# Patient Record
Sex: MALE | Race: WHITE | NOT HISPANIC OR LATINO | Employment: UNEMPLOYED | URBAN - METROPOLITAN AREA
[De-identification: names, ages, dates, MRNs, and addresses within clinical notes are randomized per-mention and may not be internally consistent; named-entity substitution may affect disease eponyms.]

---

## 2019-03-28 ENCOUNTER — HOSPITAL ENCOUNTER (INPATIENT)
Facility: HOSPITAL | Age: 44
LOS: 1 days | Discharge: LEFT BEFORE DISCHARGE | DRG: 281 | End: 2019-03-29
Attending: EMERGENCY MEDICINE | Admitting: EMERGENCY MEDICINE
Payer: COMMERCIAL

## 2019-03-28 DIAGNOSIS — R11.2 NAUSEA AND VOMITING: ICD-10-CM

## 2019-03-28 DIAGNOSIS — E87.6 HYPOKALEMIA: ICD-10-CM

## 2019-03-28 DIAGNOSIS — I21.4 NSTEMI (NON-ST ELEVATED MYOCARDIAL INFARCTION): Primary | ICD-10-CM

## 2019-03-28 DIAGNOSIS — R11.10 VOMITING: ICD-10-CM

## 2019-03-28 DIAGNOSIS — R79.89 ELEVATED TROPONIN: ICD-10-CM

## 2019-03-28 PROCEDURE — 84484 ASSAY OF TROPONIN QUANT: CPT

## 2019-03-28 PROCEDURE — 96375 TX/PRO/DX INJ NEW DRUG ADDON: CPT

## 2019-03-28 PROCEDURE — 85007 BL SMEAR W/DIFF WBC COUNT: CPT

## 2019-03-28 PROCEDURE — 99285 EMERGENCY DEPT VISIT HI MDM: CPT | Mod: 25

## 2019-03-28 PROCEDURE — 80053 COMPREHEN METABOLIC PANEL: CPT

## 2019-03-28 PROCEDURE — 96361 HYDRATE IV INFUSION ADD-ON: CPT

## 2019-03-28 PROCEDURE — 83690 ASSAY OF LIPASE: CPT

## 2019-03-28 PROCEDURE — 96365 THER/PROPH/DIAG IV INF INIT: CPT

## 2019-03-28 PROCEDURE — 85027 COMPLETE CBC AUTOMATED: CPT

## 2019-03-28 RX ORDER — ONDANSETRON 2 MG/ML
8 INJECTION INTRAMUSCULAR; INTRAVENOUS
Status: COMPLETED | OUTPATIENT
Start: 2019-03-29 | End: 2019-03-28

## 2019-03-28 RX ADMIN — SODIUM CHLORIDE 1000 ML: 0.9 INJECTION, SOLUTION INTRAVENOUS at 11:03

## 2019-03-28 RX ADMIN — ONDANSETRON 8 MG: 2 INJECTION INTRAMUSCULAR; INTRAVENOUS at 11:03

## 2019-03-29 VITALS
RESPIRATION RATE: 18 BRPM | DIASTOLIC BLOOD PRESSURE: 85 MMHG | HEART RATE: 103 BPM | OXYGEN SATURATION: 93 % | HEIGHT: 73 IN | TEMPERATURE: 98 F | WEIGHT: 212.31 LBS | BODY MASS INDEX: 28.14 KG/M2 | SYSTOLIC BLOOD PRESSURE: 142 MMHG

## 2019-03-29 PROBLEM — I10 ACCELERATED HYPERTENSION: Status: ACTIVE | Noted: 2019-03-29

## 2019-03-29 PROBLEM — E87.6 HYPOKALEMIA: Status: ACTIVE | Noted: 2019-03-29

## 2019-03-29 PROBLEM — F12.90 MARIJUANA USE, CONTINUOUS: Status: ACTIVE | Noted: 2019-03-29

## 2019-03-29 PROBLEM — N17.9 AKI (ACUTE KIDNEY INJURY): Status: ACTIVE | Noted: 2019-03-29

## 2019-03-29 PROBLEM — D72.829 LEUKOCYTOSIS: Status: ACTIVE | Noted: 2019-03-29

## 2019-03-29 PROBLEM — I21.4 NSTEMI (NON-ST ELEVATED MYOCARDIAL INFARCTION): Status: ACTIVE | Noted: 2019-03-29

## 2019-03-29 PROBLEM — E78.5 HYPERLIPIDEMIA: Status: ACTIVE | Noted: 2019-03-29

## 2019-03-29 PROBLEM — R11.2 NAUSEA AND VOMITING: Status: ACTIVE | Noted: 2019-03-29

## 2019-03-29 LAB
ALBUMIN SERPL BCP-MCNC: 5.5 G/DL (ref 3.5–5.2)
ALLENS TEST: ABNORMAL
ALP SERPL-CCNC: 72 U/L (ref 55–135)
ALT SERPL W/O P-5'-P-CCNC: 31 U/L (ref 10–44)
ANION GAP SERPL CALC-SCNC: 11 MMOL/L (ref 8–16)
ANION GAP SERPL CALC-SCNC: 18 MMOL/L (ref 8–16)
ANION GAP SERPL CALC-SCNC: 22 MMOL/L (ref 8–16)
AORTIC ROOT ANNULUS: 3.34 CM
AORTIC VALVE CUSP SEPERATION: 2.64 CM
ASCENDING AORTA: 2.98 CM
AST SERPL-CCNC: 35 U/L (ref 10–40)
AV INDEX (PROSTH): 0.76
AV MEAN GRADIENT: 4.62 MMHG
AV PEAK GRADIENT: 8.07 MMHG
AV VALVE AREA: 2.57 CM2
AV VELOCITY RATIO: 0.72
B-OH-BUTYR BLD STRIP-SCNC: 0.3 MMOL/L (ref 0–0.5)
BACTERIA #/AREA URNS HPF: ABNORMAL /HPF
BASOPHILS # BLD AUTO: 0.01 K/UL (ref 0–0.2)
BASOPHILS NFR BLD: 0 % (ref 0–1.9)
BASOPHILS NFR BLD: 0.1 % (ref 0–1.9)
BILIRUB SERPL-MCNC: 1.3 MG/DL (ref 0.1–1)
BILIRUB UR QL STRIP: ABNORMAL
BNP SERPL-MCNC: 76 PG/ML (ref 0–99)
BSA FOR ECHO PROCEDURE: 2.23 M2
BUN SERPL-MCNC: 29 MG/DL (ref 6–20)
BUN SERPL-MCNC: 32 MG/DL (ref 6–20)
BUN SERPL-MCNC: 38 MG/DL (ref 6–20)
CALCIUM SERPL-MCNC: 10 MG/DL (ref 8.7–10.5)
CALCIUM SERPL-MCNC: 11.6 MG/DL (ref 8.7–10.5)
CALCIUM SERPL-MCNC: 9.6 MG/DL (ref 8.7–10.5)
CHLORIDE SERPL-SCNC: 104 MMOL/L (ref 95–110)
CHLORIDE SERPL-SCNC: 89 MMOL/L (ref 95–110)
CHLORIDE SERPL-SCNC: 98 MMOL/L (ref 95–110)
CHOLEST SERPL-MCNC: 226 MG/DL (ref 120–199)
CHOLEST/HDLC SERPL: 4.7 {RATIO} (ref 2–5)
CLARITY UR: CLEAR
CO2 SERPL-SCNC: 28 MMOL/L (ref 23–29)
CO2 SERPL-SCNC: 29 MMOL/L (ref 23–29)
CO2 SERPL-SCNC: 31 MMOL/L (ref 23–29)
COLOR UR: ABNORMAL
CREAT SERPL-MCNC: 1 MG/DL (ref 0.5–1.4)
CREAT SERPL-MCNC: 1.2 MG/DL (ref 0.5–1.4)
CREAT SERPL-MCNC: 1.8 MG/DL (ref 0.5–1.4)
CRP SERPL-MCNC: 19.9 MG/L (ref 0–8.2)
CV ECHO LV RWT: 0.74 CM
CV STRESS BASE HR: 90 BPM
D DIMER PPP IA.FEU-MCNC: 0.64 MG/L FEU
DELSYS: ABNORMAL
DIASTOLIC BLOOD PRESSURE: 96 MMHG
DIFFERENTIAL METHOD: ABNORMAL
DIFFERENTIAL METHOD: ABNORMAL
DOP CALC AO PEAK VEL: 1.42 M/S
DOP CALC AO VTI: 25.7 CM
DOP CALC LVOT AREA: 3.36 CM2
DOP CALC LVOT DIAMETER: 2.07 CM
DOP CALC LVOT PEAK VEL: 1.02 M/S
DOP CALC LVOT STROKE VOLUME: 66.06 CM3
DOP CALCLVOT PEAK VEL VTI: 19.64 CM
E WAVE DECELERATION TIME: 168.81 MSEC
E/A RATIO: 1.04
E/E' RATIO: 5.83
ECHO LV POSTERIOR WALL: 1.6 CM (ref 0.6–1.1)
EOSINOPHIL # BLD AUTO: 0 K/UL (ref 0–0.5)
EOSINOPHIL NFR BLD: 0 % (ref 0–8)
EOSINOPHIL NFR BLD: 0 % (ref 0–8)
ERYTHROCYTE [DISTWIDTH] IN BLOOD BY AUTOMATED COUNT: 13.2 % (ref 11.5–14.5)
ERYTHROCYTE [DISTWIDTH] IN BLOOD BY AUTOMATED COUNT: 13.3 % (ref 11.5–14.5)
ERYTHROCYTE [SEDIMENTATION RATE] IN BLOOD BY WESTERGREN METHOD: 11 MM/HR (ref 0–10)
EST. GFR  (AFRICAN AMERICAN): 53 ML/MIN/1.73 M^2
EST. GFR  (AFRICAN AMERICAN): >60 ML/MIN/1.73 M^2
EST. GFR  (AFRICAN AMERICAN): >60 ML/MIN/1.73 M^2
EST. GFR  (NON AFRICAN AMERICAN): 46 ML/MIN/1.73 M^2
EST. GFR  (NON AFRICAN AMERICAN): >60 ML/MIN/1.73 M^2
EST. GFR  (NON AFRICAN AMERICAN): >60 ML/MIN/1.73 M^2
FRACTIONAL SHORTENING: 28 % (ref 28–44)
GLUCOSE SERPL-MCNC: 109 MG/DL (ref 70–110)
GLUCOSE SERPL-MCNC: 140 MG/DL (ref 70–110)
GLUCOSE SERPL-MCNC: 183 MG/DL (ref 70–110)
GLUCOSE UR QL STRIP: NEGATIVE
HCO3 UR-SCNC: 32.2 MMOL/L (ref 24–28)
HCT VFR BLD AUTO: 52.3 % (ref 40–54)
HCT VFR BLD AUTO: 53.5 % (ref 40–54)
HDLC SERPL-MCNC: 48 MG/DL (ref 40–75)
HDLC SERPL: 21.2 % (ref 20–50)
HGB BLD-MCNC: 18.4 G/DL (ref 14–18)
HGB BLD-MCNC: 18.5 G/DL (ref 14–18)
HGB UR QL STRIP: NEGATIVE
HYALINE CASTS #/AREA URNS LPF: 10 /LPF
INTERVENTRICULAR SEPTUM: 1.66 CM (ref 0.6–1.1)
IVRT: 0.12 MSEC
KETONES UR QL STRIP: ABNORMAL
LA MAJOR: 5.5 CM
LA MINOR: 4.36 CM
LA WIDTH: 3.45 CM
LACTATE SERPL-SCNC: 2.9 MMOL/L (ref 0.5–2.2)
LACTATE SERPL-SCNC: 4.1 MMOL/L (ref 0.5–2.2)
LDLC SERPL CALC-MCNC: 138 MG/DL (ref 63–159)
LEFT ATRIUM SIZE: 4 CM
LEFT ATRIUM VOLUME INDEX: 25.9 ML/M2
LEFT ATRIUM VOLUME: 57.06 CM3
LEFT INTERNAL DIMENSION IN SYSTOLE: 3.1 CM (ref 2.1–4)
LEFT VENTRICLE DIASTOLIC VOLUME INDEX: 37.74 ML/M2
LEFT VENTRICLE DIASTOLIC VOLUME: 83.3 ML
LEFT VENTRICLE MASS INDEX: 133.6 G/M2
LEFT VENTRICLE SYSTOLIC VOLUME INDEX: 17.2 ML/M2
LEFT VENTRICLE SYSTOLIC VOLUME: 37.99 ML
LEFT VENTRICULAR INTERNAL DIMENSION IN DIASTOLE: 4.31 CM (ref 3.5–6)
LEFT VENTRICULAR MASS: 294.91 G
LEUKOCYTE ESTERASE UR QL STRIP: NEGATIVE
LIPASE SERPL-CCNC: 11 U/L (ref 4–60)
LV LATERAL E/E' RATIO: 4.12
LV SEPTAL E/E' RATIO: 10
LYMPHOCYTES # BLD AUTO: 2.1 K/UL (ref 1–4.8)
LYMPHOCYTES NFR BLD: 12 % (ref 18–48)
LYMPHOCYTES NFR BLD: 6 % (ref 18–48)
MAGNESIUM SERPL-MCNC: 1.8 MG/DL (ref 1.6–2.6)
MCH RBC QN AUTO: 31.2 PG (ref 27–31)
MCH RBC QN AUTO: 31.4 PG (ref 27–31)
MCHC RBC AUTO-ENTMCNC: 34.4 G/DL (ref 32–36)
MCHC RBC AUTO-ENTMCNC: 35.4 G/DL (ref 32–36)
MCV RBC AUTO: 89 FL (ref 82–98)
MCV RBC AUTO: 91 FL (ref 82–98)
MICROSCOPIC COMMENT: ABNORMAL
MONOCYTES # BLD AUTO: 0.3 K/UL (ref 0.3–1)
MONOCYTES NFR BLD: 1.9 % (ref 4–15)
MONOCYTES NFR BLD: 11 % (ref 4–15)
MV PEAK A VEL: 0.67 M/S
MV PEAK E VEL: 0.7 M/S
NEUTROPHILS # BLD AUTO: 15.3 K/UL (ref 1.8–7.7)
NEUTROPHILS NFR BLD: 74 % (ref 38–73)
NEUTROPHILS NFR BLD: 86 % (ref 38–73)
NEUTS BAND NFR BLD MANUAL: 9 %
NITRITE UR QL STRIP: NEGATIVE
NONHDLC SERPL-MCNC: 178 MG/DL
NUC STRESS EJECTION FRACTION: 56 %
OHS CV CPX 85 PERCENT MAX PREDICTED HEART RATE MALE: 152
OHS CV CPX MAX PREDICTED HEART RATE: 179
OHS CV CPX PATIENT IS FEMALE: 0
OHS CV CPX PATIENT IS MALE: 1
OHS CV CPX PEAK DIASTOLIC BLOOD PRESSURE: 92 MMHG
OHS CV CPX PEAK HEAR RATE: 130 BPM
OHS CV CPX PEAK RATE PRESSURE PRODUCT: NORMAL
OHS CV CPX PEAK SYSTOLIC BLOOD PRESSURE: 126 MMHG
OHS CV CPX PERCENT MAX PREDICTED HEART RATE ACHIEVED: 73
OHS CV CPX RATE PRESSURE PRODUCT PRESENTING: NORMAL
PCO2 BLDA: 37.4 MMHG (ref 35–45)
PH SMN: 7.54 [PH] (ref 7.35–7.45)
PH UR STRIP: 7 [PH] (ref 5–8)
PISA TR MAX VEL: 2.71 M/S
PLATELET # BLD AUTO: 337 K/UL (ref 150–350)
PLATELET # BLD AUTO: 374 K/UL (ref 150–350)
PLATELET BLD QL SMEAR: ABNORMAL
PMV BLD AUTO: 10.4 FL (ref 9.2–12.9)
PMV BLD AUTO: 9.4 FL (ref 9.2–12.9)
PO2 BLDA: 16 MMHG (ref 40–60)
POC BE: 9 MMOL/L
POC SATURATED O2: 26 % (ref 95–100)
POC TCO2: 33 MMOL/L (ref 24–29)
POTASSIUM SERPL-SCNC: 2.6 MMOL/L (ref 3.5–5.1)
POTASSIUM SERPL-SCNC: 2.7 MMOL/L (ref 3.5–5.1)
POTASSIUM SERPL-SCNC: 3.1 MMOL/L (ref 3.5–5.1)
PROT SERPL-MCNC: 9.9 G/DL (ref 6–8.4)
PROT UR QL STRIP: ABNORMAL
PV PEAK VELOCITY: 1.16 CM/S
RA MAJOR: 4.26 CM
RA PRESSURE: 3 MMHG
RA WIDTH: 2.04 CM
RBC # BLD AUTO: 5.89 M/UL (ref 4.6–6.2)
RBC # BLD AUTO: 5.9 M/UL (ref 4.6–6.2)
RBC #/AREA URNS HPF: 2 /HPF (ref 0–4)
RIGHT VENTRICULAR END-DIASTOLIC DIMENSION: 3 CM
RV TISSUE DOPPLER FREE WALL SYSTOLIC VELOCITY 1 (APICAL 4 CHAMBER VIEW): 11.33 M/S
SAMPLE: ABNORMAL
SINUS: 3.17 CM
SITE: ABNORMAL
SODIUM SERPL-SCNC: 142 MMOL/L (ref 136–145)
SODIUM SERPL-SCNC: 144 MMOL/L (ref 136–145)
SODIUM SERPL-SCNC: 144 MMOL/L (ref 136–145)
SP GR UR STRIP: >1.03 (ref 1–1.03)
SQUAMOUS #/AREA URNS HPF: 1 /HPF
STJ: 2.7 CM
SYSTOLIC BLOOD PRESSURE: 138 MMHG
TDI LATERAL: 0.17
TDI SEPTAL: 0.07
TDI: 0.12
TR MAX PG: 29.38 MMHG
TRICUSPID ANNULAR PLANE SYSTOLIC EXCURSION: 2.69 CM
TRIGL SERPL-MCNC: 200 MG/DL (ref 30–150)
TROPONIN I SERPL DL<=0.01 NG/ML-MCNC: 0.11 NG/ML (ref 0–0.03)
TROPONIN I SERPL DL<=0.01 NG/ML-MCNC: 0.17 NG/ML (ref 0–0.03)
TROPONIN I SERPL DL<=0.01 NG/ML-MCNC: 0.24 NG/ML (ref 0–0.03)
TROPONIN I SERPL DL<=0.01 NG/ML-MCNC: 0.27 NG/ML (ref 0–0.03)
TV REST PULMONARY ARTERY PRESSURE: 32 MMHG
URN SPEC COLLECT METH UR: ABNORMAL
UROBILINOGEN UR STRIP-ACNC: ABNORMAL EU/DL
WBC # BLD AUTO: 17.75 K/UL (ref 3.9–12.7)
WBC # BLD AUTO: 19.52 K/UL (ref 3.9–12.7)
WBC #/AREA URNS HPF: 8 /HPF (ref 0–5)

## 2019-03-29 PROCEDURE — 63600175 PHARM REV CODE 636 W HCPCS: Performed by: HOSPITALIST

## 2019-03-29 PROCEDURE — 63600175 PHARM REV CODE 636 W HCPCS: Performed by: INTERNAL MEDICINE

## 2019-03-29 PROCEDURE — 63600175 PHARM REV CODE 636 W HCPCS: Performed by: PHYSICIAN ASSISTANT

## 2019-03-29 PROCEDURE — 85379 FIBRIN DEGRADATION QUANT: CPT

## 2019-03-29 PROCEDURE — 94761 N-INVAS EAR/PLS OXIMETRY MLT: CPT

## 2019-03-29 PROCEDURE — 83605 ASSAY OF LACTIC ACID: CPT | Mod: 91

## 2019-03-29 PROCEDURE — 84484 ASSAY OF TROPONIN QUANT: CPT | Mod: 91

## 2019-03-29 PROCEDURE — 86140 C-REACTIVE PROTEIN: CPT

## 2019-03-29 PROCEDURE — 21400001 HC TELEMETRY ROOM

## 2019-03-29 PROCEDURE — 93010 ELECTROCARDIOGRAM REPORT: CPT | Mod: ,,, | Performed by: INTERNAL MEDICINE

## 2019-03-29 PROCEDURE — 87040 BLOOD CULTURE FOR BACTERIA: CPT

## 2019-03-29 PROCEDURE — 83735 ASSAY OF MAGNESIUM: CPT

## 2019-03-29 PROCEDURE — 85652 RBC SED RATE AUTOMATED: CPT

## 2019-03-29 PROCEDURE — 83880 ASSAY OF NATRIURETIC PEPTIDE: CPT

## 2019-03-29 PROCEDURE — 25000003 PHARM REV CODE 250: Performed by: EMERGENCY MEDICINE

## 2019-03-29 PROCEDURE — 82010 KETONE BODYS QUAN: CPT

## 2019-03-29 PROCEDURE — 85025 COMPLETE CBC W/AUTO DIFF WBC: CPT

## 2019-03-29 PROCEDURE — 82803 BLOOD GASES ANY COMBINATION: CPT

## 2019-03-29 PROCEDURE — 25000003 PHARM REV CODE 250: Performed by: PHYSICIAN ASSISTANT

## 2019-03-29 PROCEDURE — 80061 LIPID PANEL: CPT

## 2019-03-29 PROCEDURE — 63600175 PHARM REV CODE 636 W HCPCS: Performed by: EMERGENCY MEDICINE

## 2019-03-29 PROCEDURE — 36415 COLL VENOUS BLD VENIPUNCTURE: CPT

## 2019-03-29 PROCEDURE — 84484 ASSAY OF TROPONIN QUANT: CPT

## 2019-03-29 PROCEDURE — 93005 ELECTROCARDIOGRAM TRACING: CPT

## 2019-03-29 PROCEDURE — 99254 IP/OBS CNSLTJ NEW/EST MOD 60: CPT | Mod: 25,,, | Performed by: INTERNAL MEDICINE

## 2019-03-29 PROCEDURE — 99900035 HC TECH TIME PER 15 MIN (STAT)

## 2019-03-29 PROCEDURE — 80048 BASIC METABOLIC PNL TOTAL CA: CPT | Mod: 91

## 2019-03-29 PROCEDURE — 81000 URINALYSIS NONAUTO W/SCOPE: CPT

## 2019-03-29 PROCEDURE — 80048 BASIC METABOLIC PNL TOTAL CA: CPT

## 2019-03-29 PROCEDURE — 93010 EKG 12-LEAD: ICD-10-PCS | Mod: ,,, | Performed by: INTERNAL MEDICINE

## 2019-03-29 PROCEDURE — 99254 PR INITIAL INPATIENT CONSULT,LEVL IV: ICD-10-PCS | Mod: 25,,, | Performed by: INTERNAL MEDICINE

## 2019-03-29 RX ORDER — POTASSIUM CHLORIDE 7.45 MG/ML
10 INJECTION INTRAVENOUS
Status: COMPLETED | OUTPATIENT
Start: 2019-03-29 | End: 2019-03-29

## 2019-03-29 RX ORDER — ASPIRIN 325 MG
325 TABLET ORAL DAILY
Status: DISCONTINUED | OUTPATIENT
Start: 2019-03-30 | End: 2019-03-29 | Stop reason: HOSPADM

## 2019-03-29 RX ORDER — MORPHINE SULFATE 10 MG/ML
4 INJECTION INTRAMUSCULAR; INTRAVENOUS; SUBCUTANEOUS EVERY 4 HOURS PRN
Status: DISCONTINUED | OUTPATIENT
Start: 2019-03-29 | End: 2019-03-29 | Stop reason: HOSPADM

## 2019-03-29 RX ORDER — POTASSIUM CHLORIDE 7.45 MG/ML
10 INJECTION INTRAVENOUS
Status: DISPENSED | OUTPATIENT
Start: 2019-03-29 | End: 2019-03-29

## 2019-03-29 RX ORDER — REGADENOSON 0.08 MG/ML
0.4 INJECTION, SOLUTION INTRAVENOUS ONCE
Status: COMPLETED | OUTPATIENT
Start: 2019-03-29 | End: 2019-03-29

## 2019-03-29 RX ORDER — CLONIDINE HYDROCHLORIDE 0.1 MG/1
0.1 TABLET ORAL EVERY 6 HOURS PRN
Status: DISCONTINUED | OUTPATIENT
Start: 2019-03-29 | End: 2019-03-29 | Stop reason: HOSPADM

## 2019-03-29 RX ORDER — NITROGLYCERIN 0.4 MG/1
0.4 TABLET SUBLINGUAL EVERY 5 MIN PRN
Status: DISCONTINUED | OUTPATIENT
Start: 2019-03-29 | End: 2019-03-29 | Stop reason: HOSPADM

## 2019-03-29 RX ORDER — SODIUM CHLORIDE 0.9 % (FLUSH) 0.9 %
10 SYRINGE (ML) INJECTION
Status: DISCONTINUED | OUTPATIENT
Start: 2019-03-29 | End: 2019-03-29 | Stop reason: HOSPADM

## 2019-03-29 RX ORDER — POTASSIUM CHLORIDE 20 MEQ/15ML
40 SOLUTION ORAL
Status: DISPENSED | OUTPATIENT
Start: 2019-03-29 | End: 2019-03-29

## 2019-03-29 RX ORDER — METOPROLOL TARTRATE 25 MG/1
12.5 TABLET ORAL 2 TIMES DAILY
Status: DISCONTINUED | OUTPATIENT
Start: 2019-03-29 | End: 2019-03-29 | Stop reason: HOSPADM

## 2019-03-29 RX ORDER — LISINOPRIL 5 MG/1
5 TABLET ORAL DAILY
Status: DISCONTINUED | OUTPATIENT
Start: 2019-03-29 | End: 2019-03-29 | Stop reason: HOSPADM

## 2019-03-29 RX ORDER — MORPHINE SULFATE 10 MG/ML
2 INJECTION INTRAMUSCULAR; INTRAVENOUS; SUBCUTANEOUS
Status: DISCONTINUED | OUTPATIENT
Start: 2019-03-29 | End: 2019-03-29 | Stop reason: HOSPADM

## 2019-03-29 RX ORDER — ACETAMINOPHEN 325 MG/1
650 TABLET ORAL EVERY 4 HOURS PRN
Status: DISCONTINUED | OUTPATIENT
Start: 2019-03-29 | End: 2019-03-29 | Stop reason: HOSPADM

## 2019-03-29 RX ORDER — ONDANSETRON 2 MG/ML
8 INJECTION INTRAMUSCULAR; INTRAVENOUS EVERY 6 HOURS PRN
Status: DISCONTINUED | OUTPATIENT
Start: 2019-03-29 | End: 2019-03-29 | Stop reason: HOSPADM

## 2019-03-29 RX ORDER — SODIUM CHLORIDE AND POTASSIUM CHLORIDE 150; 900 MG/100ML; MG/100ML
INJECTION, SOLUTION INTRAVENOUS CONTINUOUS
Status: DISCONTINUED | OUTPATIENT
Start: 2019-03-29 | End: 2019-03-29 | Stop reason: HOSPADM

## 2019-03-29 RX ORDER — METOCLOPRAMIDE HYDROCHLORIDE 5 MG/ML
10 INJECTION INTRAMUSCULAR; INTRAVENOUS
Status: DISCONTINUED | OUTPATIENT
Start: 2019-03-29 | End: 2019-03-29

## 2019-03-29 RX ORDER — METOCLOPRAMIDE HYDROCHLORIDE 5 MG/ML
10 INJECTION INTRAMUSCULAR; INTRAVENOUS
Status: COMPLETED | OUTPATIENT
Start: 2019-03-29 | End: 2019-03-29

## 2019-03-29 RX ORDER — ATORVASTATIN CALCIUM 40 MG/1
80 TABLET, FILM COATED ORAL DAILY
Status: DISCONTINUED | OUTPATIENT
Start: 2019-03-29 | End: 2019-03-29 | Stop reason: HOSPADM

## 2019-03-29 RX ORDER — ENOXAPARIN SODIUM 100 MG/ML
1 INJECTION SUBCUTANEOUS
Status: DISCONTINUED | OUTPATIENT
Start: 2019-03-29 | End: 2019-03-29 | Stop reason: HOSPADM

## 2019-03-29 RX ORDER — POTASSIUM CHLORIDE 20 MEQ/1
40 TABLET, EXTENDED RELEASE ORAL
Status: COMPLETED | OUTPATIENT
Start: 2019-03-29 | End: 2019-03-29

## 2019-03-29 RX ORDER — CLOPIDOGREL BISULFATE 75 MG/1
75 TABLET ORAL DAILY
Status: DISCONTINUED | OUTPATIENT
Start: 2019-03-29 | End: 2019-03-29 | Stop reason: HOSPADM

## 2019-03-29 RX ORDER — ASPIRIN 325 MG
325 TABLET, DELAYED RELEASE (ENTERIC COATED) ORAL
Status: COMPLETED | OUTPATIENT
Start: 2019-03-29 | End: 2019-03-29

## 2019-03-29 RX ADMIN — METOPROLOL TARTRATE 12.5 MG: 25 TABLET, FILM COATED ORAL at 08:03

## 2019-03-29 RX ADMIN — CLOPIDOGREL BISULFATE 75 MG: 75 TABLET ORAL at 08:03

## 2019-03-29 RX ADMIN — ASPIRIN 325 MG: 325 TABLET, DELAYED RELEASE ORAL at 02:03

## 2019-03-29 RX ADMIN — SODIUM CHLORIDE AND POTASSIUM CHLORIDE: .9; .15 SOLUTION INTRAVENOUS at 02:03

## 2019-03-29 RX ADMIN — SODIUM CHLORIDE AND POTASSIUM CHLORIDE: .9; .15 SOLUTION INTRAVENOUS at 04:03

## 2019-03-29 RX ADMIN — ATORVASTATIN CALCIUM 80 MG: 40 TABLET, FILM COATED ORAL at 08:03

## 2019-03-29 RX ADMIN — LISINOPRIL 5 MG: 5 TABLET ORAL at 08:03

## 2019-03-29 RX ADMIN — ENOXAPARIN SODIUM 100 MG: 100 INJECTION SUBCUTANEOUS at 04:03

## 2019-03-29 RX ADMIN — POTASSIUM CHLORIDE 10 MEQ: 7.46 INJECTION, SOLUTION INTRAVENOUS at 11:03

## 2019-03-29 RX ADMIN — REGADENOSON 0.4 MG: 0.08 INJECTION, SOLUTION INTRAVENOUS at 01:03

## 2019-03-29 RX ADMIN — METOCLOPRAMIDE 10 MG: 5 INJECTION, SOLUTION INTRAMUSCULAR; INTRAVENOUS at 02:03

## 2019-03-29 RX ADMIN — POTASSIUM CHLORIDE 10 MEQ: 7.46 INJECTION, SOLUTION INTRAVENOUS at 01:03

## 2019-03-29 RX ADMIN — SODIUM CHLORIDE 1000 ML: 0.9 INJECTION, SOLUTION INTRAVENOUS at 12:03

## 2019-03-29 RX ADMIN — POTASSIUM CHLORIDE 10 MEQ: 7.46 INJECTION, SOLUTION INTRAVENOUS at 02:03

## 2019-03-29 RX ADMIN — ACETAMINOPHEN 650 MG: 325 TABLET, FILM COATED ORAL at 08:03

## 2019-03-29 RX ADMIN — ONDANSETRON 8 MG: 2 INJECTION INTRAMUSCULAR; INTRAVENOUS at 02:03

## 2019-03-29 RX ADMIN — POTASSIUM CHLORIDE 40 MEQ: 1500 TABLET, EXTENDED RELEASE ORAL at 12:03

## 2019-03-29 NOTE — ASSESSMENT & PLAN NOTE
Mild troponin elevation possibly consistent with demand in the setting of a KI, electrolyte abnormality and cyclical vomiting  No current chest pain symptoms  Discussed options will plan for initially noninvasive stress test  Follow-up echocardiogram

## 2019-03-29 NOTE — NURSING
"AMA form signed and witnessed, pt fully understand that primary Md is not releasing him at this time and he (the patient ) is choosing to leave without the advice from his primary MD. All lines removed wife at bedside. Both full understand and verbalized understanding leaving AMA. Pt stated, " will go to the [facility] when I get back to [place]".   "

## 2019-03-29 NOTE — PLAN OF CARE
03/29/19 1059   Discharge Assessment   Assessment Type Discharge Planning Assessment   Confirmed/corrected address and phone number on facesheet? Yes   Assessment information obtained from? Patient   Prior to hospitilization cognitive status: Alert/Oriented   Prior to hospitalization functional status: Independent   Current cognitive status: Alert/Oriented   Current Functional Status: Independent   Facility Arrived From: lives in [place] on vacation    Lives With spouse;child(mary), dependent   Able to Return to Prior Arrangements yes   Is patient able to care for self after discharge? Yes   Who are your caregiver(s) and their phone number(s)? Rony [phone]   Patient's perception of discharge disposition home or selfcare   Readmission Within the Last 30 Days no previous admission in last 30 days   Patient currently being followed by outpatient case management? No   Patient currently receives any other outside agency services? No   Equipment Currently Used at Home none   Does the patient have transportation home? Yes   Transportation Anticipated family or friend will provide;car, drives self   Dialysis Name and Scheduled days N/A   Does the patient receive services at the [facility]? No   Discharge Plan A Home with family   Discharge Plan B Home with family   DME Needed Upon Discharge  none   Patient/Family in Agreement with Plan yes   Does the patient have transportation to healthcare appointments? Yes      PT is here visiting from [place] with his family, pt states he has contacted a cardiologist in [place].

## 2019-03-29 NOTE — NURSING
Pt arrived to floor, AAAX4 resp even and unlabored, resp even and unlabored. Tele NSR, IVF maintained will cont to monitor.

## 2019-03-29 NOTE — CONSULTS
[facility]  Cardiology  Consult Note    Patient Name: Kenton Bush  MRN: [id number]  Admission Date: 3/28/2019  Hospital Length of Stay: 0 days  Code Status: Full Code   Attending Provider: Rosana Osuna MD   Consulting Provider: David Kohler MD  Primary Care Physician: To Obtain Unable  Principal Problem:NSTEMI (non-ST elevated myocardial infarction)    Patient information was obtained from patient, past medical records and ER records.     Inpatient consult to Cardiology  Consult performed by: David Kohler MD  Consult ordered by: Rosana Osuna MD  Reason for consult: Elevated troponin        Subjective:     Chief Complaint:  Vomiting     HPI:   43 y.o. male who has no known past medical history presents to the ED complaining of nausea and vomiting that began yesterday.  He reports greater than 30 episodes of vomiting last 24 hr.  He reports associated generalized abdominal pain. Patient admits to daily cannabinoid use and has had similar bouts of nausea and vomiting in the past.  He reports improvement of nausea when he takes a hot shower.  Denies diarrhea, constipation, dysuria, hematuria, urinary frequency, chest pain, shortness of breath, cough, fever or chills. No prior treatment.    Denies any previous cardiac issues.  Has mildly elevated troponin in centrally flat pattern which is already trended down.  He denies any current chest pain symptoms that on and off issues mainly related to cyclical vomiting in the past.  He said they are planning on doing possibly a cardiac workup at the [facility].  He denies any other associated symptoms.  He has experienced no PND, orthopnea or lifts lower extremity edema.  He denies any dizziness to the point of presyncope or syncope.          History reviewed. No pertinent past medical history.    History reviewed. No pertinent surgical history.    Review of patient's allergies indicates:  No Known Allergies    No current  facility-administered medications on file prior to encounter.      No current outpatient medications on file prior to encounter.     Family History     None        Tobacco Use    Smoking status: Never Smoker    Smokeless tobacco: Never Used   Substance and Sexual Activity    Alcohol use: Not Currently    Drug use: Never    Sexual activity: Not on file     Review of Systems   Constitution: Negative.   HENT: Negative.    Eyes: Negative.    Cardiovascular: Positive for chest pain. Negative for dyspnea on exertion, irregular heartbeat, leg swelling, near-syncope, orthopnea, palpitations, paroxysmal nocturnal dyspnea and syncope.   Respiratory: Negative for shortness of breath.    Skin: Negative.    Musculoskeletal: Negative.    Gastrointestinal: Positive for nausea and vomiting. Negative for abdominal pain, constipation and diarrhea.   Genitourinary: Negative for dysuria.   Neurological: Negative for dizziness.   Psychiatric/Behavioral: Negative.      Objective:     Vital Signs (Most Recent):  Temp: 98.1 °F (36.7 °C) (03/29/19 0715)  Pulse: 81 (03/29/19 1000)  Resp: 18 (03/29/19 1000)  BP: (!) 142/85 (03/29/19 1000)  SpO2: 96 % (03/29/19 1000) Vital Signs (24h Range):  Temp:  [97.5 °F (36.4 °C)-98.6 °F (37 °C)] 98.1 °F (36.7 °C)  Pulse:  [] 81  Resp:  [6-48] 18  SpO2:  [91 %-100 %] 96 %  BP: (142-180)/() 142/85     Weight: 96.3 kg (212 lb 4.9 oz)  Body mass index is 28.01 kg/m².    SpO2: 96 %  O2 Device (Oxygen Therapy): room air      Intake/Output Summary (Last 24 hours) at 3/29/2019 1105  Last data filed at 3/29/2019 1000  Gross per 24 hour   Intake 2818.75 ml   Output 250 ml   Net 2568.75 ml       Lines/Drains/Airways     Peripheral Intravenous Line                 Peripheral IV - Single Lumen 03/28/19 Left Antecubital 1 day         Peripheral IV - Single Lumen 03/29/19 0050 Right Antecubital less than 1 day                Physical Exam   Constitutional: He is oriented to person, place, and time. He  appears well-developed and well-nourished. No distress.   HENT:   Head: Normocephalic and atraumatic.   Eyes: Pupils are equal, round, and reactive to light. Conjunctivae and EOM are normal.   Neck: Normal range of motion. Neck supple. No thyromegaly present.   Cardiovascular: Normal rate, regular rhythm and normal heart sounds.   No murmur heard.  Pulmonary/Chest: Effort normal and breath sounds normal. No respiratory distress. He has no wheezes. He has no rales. He exhibits no tenderness.   Abdominal: Soft. Bowel sounds are normal.   Musculoskeletal: He exhibits no edema.   Neurological: He is alert and oriented to person, place, and time.   Skin: Skin is warm and dry.   Psychiatric: He has a normal mood and affect. His behavior is normal.       Significant Labs:   CMP   Recent Labs   Lab 03/28/19 2344 03/29/19 0322    144   K 2.7* 2.6*   CL 89* 98   CO2 31* 28   * 140*   BUN 32* 29*   CREATININE 1.8* 1.2   CALCIUM 11.6* 10.0   PROT 9.9*  --    ALBUMIN 5.5*  --    BILITOT 1.3*  --    ALKPHOS 72  --    AST 35  --    ALT 31  --    ANIONGAP 22* 18*   ESTGFRAFRICA 53* >60   EGFRNONAA 46* >60   , CBC   Recent Labs   Lab 03/28/19 2344 03/29/19 0322   WBC 19.52* 17.75*   HGB 18.5* 18.4*   HCT 52.3 53.5    374*   , INR No results for input(s): INR, PROTIME in the last 48 hours., Lipid Panel   Recent Labs   Lab 03/29/19  0322   CHOL 226*   HDL 48   LDLCALC 138.0   TRIG 200*   CHOLHDL 21.2    and Troponin   Recent Labs   Lab 03/28/19 2344 03/29/19 0322 03/29/19  0815   TROPONINI 0.245* 0.272* 0.165*       Significant Imaging: EKG: Normal sinus rhythm with nonspecific ST-T changes    Assessment and Plan:     * NSTEMI (non-ST elevated myocardial infarction)  Mild troponin elevation possibly consistent with demand in the setting of a KI, electrolyte abnormality and cyclical vomiting  No current chest pain symptoms  Discussed options will plan for initially noninvasive stress test  Follow-up  echocardiogram    TAMIA (acute kidney injury)  IV fluid hydration  Electrolyte repletion per primary    Marijuana use, continuous  Counseled  Cyclic vomiting syndrome --> supportive care    Accelerated hypertension  Currently stable    Hyperlipidemia  Continue follow lipids  Previously recommended statin per history        VTE Risk Mitigation (From admission, onward)        Ordered     enoxaparin injection 100 mg  Every 12 hours (non-standard times)      03/29/19 0317     IP VTE HIGH RISK PATIENT  Once      03/29/19 0403     Place YASHIRA hose  Until discontinued      03/29/19 0403     Place sequential compression device  Until discontinued      03/29/19 0403          Thank you for your consult. I will follow-up with patient. Please contact us if you have any additional questions.    David Kohler MD  Cardiology   [facility]

## 2019-03-29 NOTE — SUBJECTIVE & OBJECTIVE
History reviewed. No pertinent past medical history.    History reviewed. No pertinent surgical history.    Review of patient's allergies indicates:  No Known Allergies    No current facility-administered medications on file prior to encounter.      No current outpatient medications on file prior to encounter.     Family History     None        Tobacco Use    Smoking status: Never Smoker    Smokeless tobacco: Never Used   Substance and Sexual Activity    Alcohol use: Not Currently    Drug use: Never    Sexual activity: Not on file     Review of Systems   Constitution: Negative.   HENT: Negative.    Eyes: Negative.    Cardiovascular: Positive for chest pain. Negative for dyspnea on exertion, irregular heartbeat, leg swelling, near-syncope, orthopnea, palpitations, paroxysmal nocturnal dyspnea and syncope.   Respiratory: Negative for shortness of breath.    Skin: Negative.    Musculoskeletal: Negative.    Gastrointestinal: Positive for nausea and vomiting. Negative for abdominal pain, constipation and diarrhea.   Genitourinary: Negative for dysuria.   Neurological: Negative for dizziness.   Psychiatric/Behavioral: Negative.      Objective:     Vital Signs (Most Recent):  Temp: 98.1 °F (36.7 °C) (03/29/19 0715)  Pulse: 81 (03/29/19 1000)  Resp: 18 (03/29/19 1000)  BP: (!) 142/85 (03/29/19 1000)  SpO2: 96 % (03/29/19 1000) Vital Signs (24h Range):  Temp:  [97.5 °F (36.4 °C)-98.6 °F (37 °C)] 98.1 °F (36.7 °C)  Pulse:  [] 81  Resp:  [6-48] 18  SpO2:  [91 %-100 %] 96 %  BP: (142-180)/() 142/85     Weight: 96.3 kg (212 lb 4.9 oz)  Body mass index is 28.01 kg/m².    SpO2: 96 %  O2 Device (Oxygen Therapy): room air      Intake/Output Summary (Last 24 hours) at 3/29/2019 1105  Last data filed at 3/29/2019 1000  Gross per 24 hour   Intake 2818.75 ml   Output 250 ml   Net 2568.75 ml       Lines/Drains/Airways     Peripheral Intravenous Line                 Peripheral IV - Single Lumen 03/28/19 Left Antecubital 1  day         Peripheral IV - Single Lumen 03/29/19 0050 Right Antecubital less than 1 day                Physical Exam   Constitutional: He is oriented to person, place, and time. He appears well-developed and well-nourished. No distress.   HENT:   Head: Normocephalic and atraumatic.   Eyes: Pupils are equal, round, and reactive to light. Conjunctivae and EOM are normal.   Neck: Normal range of motion. Neck supple. No thyromegaly present.   Cardiovascular: Normal rate, regular rhythm and normal heart sounds.   No murmur heard.  Pulmonary/Chest: Effort normal and breath sounds normal. No respiratory distress. He has no wheezes. He has no rales. He exhibits no tenderness.   Abdominal: Soft. Bowel sounds are normal.   Musculoskeletal: He exhibits no edema.   Neurological: He is alert and oriented to person, place, and time.   Skin: Skin is warm and dry.   Psychiatric: He has a normal mood and affect. His behavior is normal.       Significant Labs:   CMP   Recent Labs   Lab 03/28/19 2344 03/29/19 0322    144   K 2.7* 2.6*   CL 89* 98   CO2 31* 28   * 140*   BUN 32* 29*   CREATININE 1.8* 1.2   CALCIUM 11.6* 10.0   PROT 9.9*  --    ALBUMIN 5.5*  --    BILITOT 1.3*  --    ALKPHOS 72  --    AST 35  --    ALT 31  --    ANIONGAP 22* 18*   ESTGFRAFRICA 53* >60   EGFRNONAA 46* >60   , CBC   Recent Labs   Lab 03/28/19 2344 03/29/19 0322   WBC 19.52* 17.75*   HGB 18.5* 18.4*   HCT 52.3 53.5    374*   , INR No results for input(s): INR, PROTIME in the last 48 hours., Lipid Panel   Recent Labs   Lab 03/29/19 0322   CHOL 226*   HDL 48   LDLCALC 138.0   TRIG 200*   CHOLHDL 21.2    and Troponin   Recent Labs   Lab 03/28/19 2344 03/29/19 0322 03/29/19  0815   TROPONINI 0.245* 0.272* 0.165*       Significant Imaging: EKG: Normal sinus rhythm with nonspecific ST-T changes

## 2019-03-29 NOTE — HPI
43 y.o. male who has no known past medical history presents to the ED complaining of nausea and vomiting that began yesterday.  He reports greater than 30 episodes of vomiting last 24 hr.  He reports associated generalized abdominal pain. Patient admits to daily cannabinoid use and has had similar bouts of nausea and vomiting in the past.  He reports improvement of nausea when he takes a hot shower.  Denies diarrhea, constipation, dysuria, hematuria, urinary frequency, chest pain, shortness of breath, cough, fever or chills. No prior treatment.    Denies any previous cardiac issues.  Has mildly elevated troponin in centrally flat pattern which is already trended down.  He denies any current chest pain symptoms that on and off issues mainly related to cyclical vomiting in the past.  He said they are planning on doing possibly a cardiac workup at the [facility].  He denies any other associated symptoms.  He has experienced no PND, orthopnea or lifts lower extremity edema.  He denies any dizziness to the point of presyncope or syncope.

## 2019-03-29 NOTE — ED TRIAGE NOTES
Pt arrived to the ED via POV from home with c/o vomiting. Pt states that vomiting started last night approx around 8pm. Pt reports vomiting up liquids when he tried drinking any fluids. Pt also reports nausea and abdominal pain. Last normal BM was on yesterday. Pt denies any other symptoms.

## 2019-03-29 NOTE — ED PROVIDER NOTES
Encounter Date: 3/28/2019       History     Chief Complaint   Patient presents with    Vomiting     pt states nausea and vomiting x 2 days. con not hold food or fluids. pt very anxious at triage. denies pain states had fish and liquor yesterday prior to sickness.      Chief Complaint:  Nausea and vomiting  History of  Present Illness: History obtained from patient. This 43 y.o. male who has no known past medical history presents to the ED complaining of nausea and vomiting that began yesterday.  He reports greater than 30 episodes of vomiting last 24 hr.  He reports associated generalized abdominal pain. Patient admits to daily cannabinoid use and has had similar bouts of nausea and vomiting in the past.  He reports improvement of nausea when he takes a hot shower.  Denies diarrhea, constipation, dysuria, hematuria, urinary frequency, chest pain, shortness of breath, cough, fever or chills. No prior treatment.        Review of patient's allergies indicates:  No Known Allergies  History reviewed. No pertinent past medical history.  History reviewed. No pertinent surgical history.  History reviewed. No pertinent family history.  Social History     Tobacco Use    Smoking status: Never Smoker    Smokeless tobacco: Never Used   Substance Use Topics    Alcohol use: Not Currently    Drug use: Never     Review of Systems   Constitutional: Negative for chills and fever.   HENT: Negative for sore throat.    Eyes: Negative for pain.   Respiratory: Negative for cough and shortness of breath.    Cardiovascular: Negative for chest pain.   Gastrointestinal: Positive for abdominal pain, nausea and vomiting. Negative for diarrhea.   Genitourinary: Negative for dysuria.   Musculoskeletal: Negative for back pain.   Skin: Negative for rash.   Neurological: Negative for headaches.       Physical Exam     Initial Vitals [03/28/19 2240]   BP Pulse Resp Temp SpO2   (!) 176/113 (!) 116 20 97.5 °F (36.4 °C) 99 %      MAP       --          Physical Exam    Nursing note and vitals reviewed.  Constitutional: He appears well-developed and well-nourished. No distress.   Patient is dry heaving   HENT:   Head: Normocephalic and atraumatic.   Mouth/Throat: Uvula is midline. Mucous membranes are dry.   Eyes: EOM are normal. Pupils are equal, round, and reactive to light.   Neck: Normal range of motion. Neck supple.   Cardiovascular: Regular rhythm and normal heart sounds. Tachycardia present.  Exam reveals no gallop and no friction rub.    No murmur heard.  Pulmonary/Chest: Breath sounds normal. No respiratory distress. He has no wheezes. He has no rhonchi. He has no rales.   Abdominal: Soft. Bowel sounds are normal. He exhibits no mass. There is generalized tenderness. There is no rebound and no guarding.   Musculoskeletal: Normal range of motion.   Neurological: He is alert and oriented to person, place, and time.   Skin: Skin is warm and dry.   Psychiatric: He has a normal mood and affect.         ED Course   Procedures  Labs Reviewed   CBC W/ AUTO DIFFERENTIAL - Abnormal; Notable for the following components:       Result Value    WBC 19.52 (*)     Hemoglobin 18.5 (*)     MCH 31.4 (*)     Gran% 74.0 (*)     Lymph% 6.0 (*)     All other components within normal limits   COMPREHENSIVE METABOLIC PANEL - Abnormal; Notable for the following components:    Potassium 2.7 (*)     Chloride 89 (*)     CO2 31 (*)     Glucose 183 (*)     BUN, Bld 32 (*)     Creatinine 1.8 (*)     Calcium 11.6 (*)     Total Protein 9.9 (*)     Albumin 5.5 (*)     Total Bilirubin 1.3 (*)     Anion Gap 22 (*)     eGFR if  53 (*)     eGFR if non  46 (*)     All other components within normal limits    Narrative:     POTASSIUM   critical result(s) called and verbal readback obtained   from JOSIE MARTIN, 03/29/2019 00:28   URINALYSIS, REFLEX TO URINE CULTURE - Abnormal; Notable for the following components:    Specific Gravity, UA >1.030 (*)     Protein,  UA 3+ (*)     Ketones, UA Trace (*)     Bilirubin (UA) 1+ (*)     Urobilinogen, UA 2.0-3.0 (*)     All other components within normal limits    Narrative:     Preferred Collection Type->Urine, Clean Catch   URINALYSIS MICROSCOPIC - Abnormal; Notable for the following components:    WBC, UA 8 (*)     Hyaline Casts, UA 10 (*)     All other components within normal limits    Narrative:     Preferred Collection Type->Urine, Clean Catch   TROPONIN I - Abnormal; Notable for the following components:    Troponin I 0.245 (*)     All other components within normal limits   ISTAT PROCEDURE - Abnormal; Notable for the following components:    POC PH 7.543 (*)     POC PO2 16 (*)     POC HCO3 32.2 (*)     POC SATURATED O2 26 (*)     POC TCO2 33 (*)     All other components within normal limits   LIPASE   BETA - HYDROXYBUTYRATE, SERUM   LACTIC ACID, PLASMA   MAGNESIUM     EKG Readings: (Independently Interpreted)   Initial Reading: No STEMI. Heart Rate: 62. Other Findings: Prolonged QT Interval.   Sinus rhythm with a rate of 62 with prolonged QTc of 558.  There appears to be slight ST depression in lead III.        Imaging Results    None          Medical Decision Making:   Initial Assessment:   This is an evaluation of a 43-year-old male who presents the ED complaining of nausea, vomiting and generalized abdominal pain. Abdomen is soft and nontender to palpation without rebound or guarding. I received a call from lab indicating the patient had a potassium of 2.7.  Patient was treated IV fluids and p.o. potassium.  EKG was obtained. EKG showed QTc of 558.  Patient was transferred to the main ED for cardiac monitoring and IV potassium.    I discussed the case with Dr. Torres who personally evaluated the patient.  Differential Diagnosis:   NSTEMI, myocarditis, DKA, sepsis, cannabinoid hyperemesis, TAMIA  Clinical Tests:   Lab Tests: Ordered and Reviewed  Radiological Study: Ordered and Reviewed  ED Management:  CBC shows a  leukocytosis of 19,000. H&H is stable. CMP shows a potassium 2.7.  Patient has elevated BUN and creatinine of 32 and 1.8.  Patient has elevated anion gap.  VBG shows pH of 7.543 and bicarb of 32.2.  Laboratory findings consistent with hypochloremic hypokalemic metabolic alkalosis.  Troponin elevated at 0.245.    Inpatient hospitalist was contacted.  Dr. Torres spoke with Dr. Coy with hospital medicine who agreed to accept the patient for inpatient treatment.    Etiology of patient's presentation in the emergency department is unclear at this time.  However, patient's laboratory findings consistent with NSTEMI, hypokalemia, TAMIA, leukocytosis, hypochloremic hypokalemic metabolic alkalosis.    I discussed the plan with the patient and family.  Patient verbalized understanding and agreed with plan.    Discussed the case with Dr. Torres at length who personally evaluated the patient and agreed with assessment and plan.                          Clinical Impression:       ICD-10-CM ICD-9-CM   1. NSTEMI (non-ST elevated myocardial infarction) I21.4 410.70   2. Hypokalemia E87.6 276.8   3. Nausea and vomiting R11.2 787.01                                Ko Ortega PA-C  03/29/19 0217

## 2019-03-29 NOTE — HPI
This 43 y.o. male who has no known past medical history presents to the ED complaining of nausea and vomiting that began yesterday.  He reports greater than 30 episodes of vomiting last 24 hr.  He reports associated generalized abdominal pain. Patient admits to daily cannabinoid use and has had similar bouts of nausea and vomiting in the past.  He reports improvement of nausea when he takes a hot shower.  Denies diarrhea, constipation, dysuria, hematuria, urinary frequency, chest pain, shortness of breath, cough, fever or chills. No prior treatment.    On admit, Cr 1.8, WBC 19k, 9% bands. K 2.6, Lactate 4, AG 22, , /113 - pt admitted to ICU as a tele boarder.   Pt has a flight to catch today and would like to be discharged. Cardiology consulted.

## 2019-03-29 NOTE — PLAN OF CARE
Problem: Adult Inpatient Plan of Care  Goal: Plan of Care Review  Outcome: Ongoing (interventions implemented as appropriate)  Pt admit to unit as tele border.  NSR with prolonged QT and inverted ST segment noted.  Pt hypertensive with BP 160s/70s.  Pt denies pain or discomfort at this time.  Denies nausea.  IVF admin per orders. Moves independently in bed.  Pt provided urinal for voiding.

## 2019-03-29 NOTE — H&P
[facility] Hospital Medicine  History & Physical    Patient Name: Kenton Bush  MRN: [id number]  Admission Date: 3/28/2019  Attending Physician: Rosana Osuna MD   Primary Care Provider: To Obtain Unable         Patient information was obtained from patient and ER records.     Subjective:     Principal Problem:NSTEMI (non-ST elevated myocardial infarction)    Chief Complaint:   Chief Complaint   Patient presents with    Vomiting     pt states nausea and vomiting x 2 days. con not hold food or fluids. pt very anxious at triage. denies pain states had fish and liquor yesterday prior to sickness.         HPI: This 43 y.o. male who has no known past medical history presents to the ED complaining of nausea and vomiting that began yesterday.  He reports greater than 30 episodes of vomiting last 24 hr.  He reports associated generalized abdominal pain. Patient admits to daily cannabinoid use and has had similar bouts of nausea and vomiting in the past.  He reports improvement of nausea when he takes a hot shower.  Denies diarrhea, constipation, dysuria, hematuria, urinary frequency, chest pain, shortness of breath, cough, fever or chills. No prior treatment.    On admit, Cr 1.8, WBC 19k, 9% bands. K 2.6, Lactate 4, AG 22, , /113 - pt admitted to ICU as a tele boarder.   Pt has a flight to catch today and would like to be discharged. Cardiology consulted.         History reviewed. No pertinent past medical history.    History reviewed. No pertinent surgical history.    Review of patient's allergies indicates:  No Known Allergies    No current facility-administered medications on file prior to encounter.      No current outpatient medications on file prior to encounter.     Family History     None        Tobacco Use    Smoking status: Never Smoker    Smokeless tobacco: Never Used   Substance and Sexual Activity    Alcohol use: Not Currently    Drug use: Never    Sexual activity: Not on  file     Review of Systems   Constitutional: Positive for appetite change.   HENT: Negative.    Eyes: Negative.    Respiratory: Negative.    Cardiovascular: Positive for chest pain.   Gastrointestinal: Positive for abdominal pain, nausea and vomiting.   Endocrine: Negative.    Genitourinary: Negative.    Musculoskeletal: Negative.    Skin: Negative.    Neurological: Negative.    Psychiatric/Behavioral: The patient is nervous/anxious.      Objective:     Vital Signs (Most Recent):  Temp: 98.1 °F (36.7 °C) (03/29/19 0715)  Pulse: 78 (03/29/19 0913)  Resp: 14 (03/29/19 0913)  BP: (!) 163/93 (03/29/19 0913)  SpO2: 97 % (03/29/19 0913) Vital Signs (24h Range):  Temp:  [97.5 °F (36.4 °C)-98.6 °F (37 °C)] 98.1 °F (36.7 °C)  Pulse:  [] 78  Resp:  [6-48] 14  SpO2:  [91 %-100 %] 97 %  BP: (155-180)/() 163/93     Weight: 96.3 kg (212 lb 4.9 oz)  Body mass index is 28.01 kg/m².    Physical Exam   Constitutional: He is oriented to person, place, and time. He appears well-developed and well-nourished.   HENT:   Head: Normocephalic and atraumatic.   Mouth/Throat: Oropharynx is clear and moist.   Eyes: Pupils are equal, round, and reactive to light. EOM are normal.   Neck: Normal range of motion. Neck supple. No JVD present.   Cardiovascular: Normal rate, regular rhythm, normal heart sounds and intact distal pulses.   Pulmonary/Chest: Effort normal and breath sounds normal. He has no rales.   Abdominal: Bowel sounds are normal. He exhibits distension. There is no rebound and no guarding.   Musculoskeletal: Normal range of motion. He exhibits no edema.   Neurological: He is alert and oriented to person, place, and time.   Skin: Skin is warm and dry. Capillary refill takes less than 2 seconds.         CRANIAL NERVES     CN III, IV, VI   Pupils are equal, round, and reactive to light.  Extraocular motions are normal.        Significant Labs:   Blood Culture: No results for input(s): LABBLOO in the last 48 hours.  CBC:    Recent Labs   Lab 03/28/19  2344 03/29/19  0322   WBC 19.52* 17.75*   HGB 18.5* 18.4*   HCT 52.3 53.5    374*     CMP:   Recent Labs   Lab 03/28/19  2344 03/29/19  0322    144   K 2.7* 2.6*   CL 89* 98   CO2 31* 28   * 140*   BUN 32* 29*   CREATININE 1.8* 1.2   CALCIUM 11.6* 10.0   PROT 9.9*  --    ALBUMIN 5.5*  --    BILITOT 1.3*  --    ALKPHOS 72  --    AST 35  --    ALT 31  --    ANIONGAP 22* 18*   EGFRNONAA 46* >60     Cardiac Markers:   Recent Labs   Lab 03/29/19  0322   BNP 76     Lipase:   Recent Labs   Lab 03/28/19 2344   LIPASE 11     Lipid Panel:   Recent Labs   Lab 03/29/19 0322   CHOL 226*   HDL 48   LDLCALC 138.0   TRIG 200*   CHOLHDL 21.2     Magnesium:   Recent Labs   Lab 03/29/19  0051   MG 1.8     POCT Glucose: No results for input(s): POCTGLUCOSE in the last 48 hours.  Troponin:   Recent Labs   Lab 03/28/19 2344 03/29/19 0322 03/29/19  0815   TROPONINI 0.245* 0.272* 0.165*     Urine Studies:   Recent Labs   Lab 03/29/19  0019   COLORU Aysha   APPEARANCEUA Clear   PHUR 7.0   SPECGRAV >1.030*   PROTEINUA 3+*   GLUCUA Negative   KETONESU Trace*   BILIRUBINUA 1+*   OCCULTUA Negative   NITRITE Negative   UROBILINOGEN 2.0-3.0*   LEUKOCYTESUR Negative   RBCUA 2   WBCUA 8*   BACTERIA Occasional   SQUAMEPITHEL 1   HYALINECASTS 10*       Significant Imaging: I have reviewed and interpreted all pertinent imaging results/findings within the past 24 hours.    Assessment/Plan:     * NSTEMI (non-ST elevated myocardial infarction)  Troponin elevated, though elevated BP and TAMIA  EKG - lateral leads ST-T wave abnl  ECHO pending  Aspirin, BB, statin and plavix given in ED  Cardiology consulted       Marijuana use, continuous  Cyclic vomiting syndrome   Supportive care        Accelerated hypertension  Uncontrolled  Start BB and acei  PRN clonidine        Hyperlipidemia  Continue statin      Leukocytosis  Acute phase reaction? Bands - no signs of infection thus far, afebrile   Monitor        TAMIA (acute kidney injury)  Baseline known  Likely due to dehydration/n/V  NS IVF with K      Nausea and vomiting  See above  zofran  IVF  Bentyl for pain       Hypokalemia  Pt given oral and IV KCL replacement  K 2.7 and down to 2.6  Mg 1.8  Continue replacement IV -persistent vomiting  Monitor BMP        VTE Risk Mitigation (From admission, onward)        Ordered     enoxaparin injection 100 mg  Every 12 hours (non-standard times)      03/29/19 0317     IP VTE HIGH RISK PATIENT  Once      03/29/19 0403     Place YASHIRA hose  Until discontinued      03/29/19 0403     Place sequential compression device  Until discontinued      03/29/19 0403        Critical care time spent on the evaluation and treatment of severe organ dysfunction, review of pertinent labs and imaging studies, discussions with consulting providers and discussions with patient/family: 40 minutes.     Rosana Osuna MD  Department of Hospital Medicine   [facility]

## 2019-03-29 NOTE — NURSING TRANSFER
Nursing Transfer Note      3/29/2019 1040                                                                                     Transfer to  [address]    Transfer via w/c    Transfer with cardiac monitor    Transported by ICU RN/transporter    Medicines sent: none    Chart send with patient: yes    Notified: wife at bedside    Patient reassessed at: 03/29/2019  1000 am    Upon arrival to floor: cardiac monitor applied, patient oriented to room, call bell in reach and bed in lowest position

## 2019-03-29 NOTE — ASSESSMENT & PLAN NOTE
Pt given oral and IV KCL replacement  K 2.7 and down to 2.6  Mg 1.8  Continue replacement IV -persistent vomiting  Monitor BMP

## 2019-03-29 NOTE — SUBJECTIVE & OBJECTIVE
History reviewed. No pertinent past medical history.    History reviewed. No pertinent surgical history.    Review of patient's allergies indicates:  No Known Allergies    No current facility-administered medications on file prior to encounter.      No current outpatient medications on file prior to encounter.     Family History     None        Tobacco Use    Smoking status: Never Smoker    Smokeless tobacco: Never Used   Substance and Sexual Activity    Alcohol use: Not Currently    Drug use: Never    Sexual activity: Not on file     Review of Systems   Constitutional: Positive for appetite change.   HENT: Negative.    Eyes: Negative.    Respiratory: Negative.    Cardiovascular: Positive for chest pain.   Gastrointestinal: Positive for abdominal pain, nausea and vomiting.   Endocrine: Negative.    Genitourinary: Negative.    Musculoskeletal: Negative.    Skin: Negative.    Neurological: Negative.    Psychiatric/Behavioral: The patient is nervous/anxious.      Objective:     Vital Signs (Most Recent):  Temp: 98.1 °F (36.7 °C) (03/29/19 0715)  Pulse: 78 (03/29/19 0913)  Resp: 14 (03/29/19 0913)  BP: (!) 163/93 (03/29/19 0913)  SpO2: 97 % (03/29/19 0913) Vital Signs (24h Range):  Temp:  [97.5 °F (36.4 °C)-98.6 °F (37 °C)] 98.1 °F (36.7 °C)  Pulse:  [] 78  Resp:  [6-48] 14  SpO2:  [91 %-100 %] 97 %  BP: (155-180)/() 163/93     Weight: 96.3 kg (212 lb 4.9 oz)  Body mass index is 28.01 kg/m².    Physical Exam   Constitutional: He is oriented to person, place, and time. He appears well-developed and well-nourished.   HENT:   Head: Normocephalic and atraumatic.   Mouth/Throat: Oropharynx is clear and moist.   Eyes: Pupils are equal, round, and reactive to light. EOM are normal.   Neck: Normal range of motion. Neck supple. No JVD present.   Cardiovascular: Normal rate, regular rhythm, normal heart sounds and intact distal pulses.   Pulmonary/Chest: Effort normal and breath sounds normal. He has no rales.    Abdominal: Bowel sounds are normal. He exhibits distension. There is no rebound and no guarding.   Musculoskeletal: Normal range of motion. He exhibits no edema.   Neurological: He is alert and oriented to person, place, and time.   Skin: Skin is warm and dry. Capillary refill takes less than 2 seconds.         CRANIAL NERVES     CN III, IV, VI   Pupils are equal, round, and reactive to light.  Extraocular motions are normal.        Significant Labs:   Blood Culture: No results for input(s): LABBLOO in the last 48 hours.  CBC:   Recent Labs   Lab 03/28/19 2344 03/29/19 0322   WBC 19.52* 17.75*   HGB 18.5* 18.4*   HCT 52.3 53.5    374*     CMP:   Recent Labs   Lab 03/28/19 2344 03/29/19 0322    144   K 2.7* 2.6*   CL 89* 98   CO2 31* 28   * 140*   BUN 32* 29*   CREATININE 1.8* 1.2   CALCIUM 11.6* 10.0   PROT 9.9*  --    ALBUMIN 5.5*  --    BILITOT 1.3*  --    ALKPHOS 72  --    AST 35  --    ALT 31  --    ANIONGAP 22* 18*   EGFRNONAA 46* >60     Cardiac Markers:   Recent Labs   Lab 03/29/19 0322   BNP 76     Lipase:   Recent Labs   Lab 03/28/19 2344   LIPASE 11     Lipid Panel:   Recent Labs   Lab 03/29/19 0322   CHOL 226*   HDL 48   LDLCALC 138.0   TRIG 200*   CHOLHDL 21.2     Magnesium:   Recent Labs   Lab 03/29/19  0051   MG 1.8     POCT Glucose: No results for input(s): POCTGLUCOSE in the last 48 hours.  Troponin:   Recent Labs   Lab 03/28/19 2344 03/29/19 0322 03/29/19  0815   TROPONINI 0.245* 0.272* 0.165*     Urine Studies:   Recent Labs   Lab 03/29/19  0019   COLORU Aysha   APPEARANCEUA Clear   PHUR 7.0   SPECGRAV >1.030*   PROTEINUA 3+*   GLUCUA Negative   KETONESU Trace*   BILIRUBINUA 1+*   OCCULTUA Negative   NITRITE Negative   UROBILINOGEN 2.0-3.0*   LEUKOCYTESUR Negative   RBCUA 2   WBCUA 8*   BACTERIA Occasional   SQUAMEPITHEL 1   HYALINECASTS 10*       Significant Imaging: I have reviewed and interpreted all pertinent imaging results/findings within the past 24 hours.

## 2019-04-03 LAB
BACTERIA BLD CULT: NORMAL
BACTERIA BLD CULT: NORMAL

## 2019-04-23 NOTE — DISCHARGE SUMMARY
[facility] Hospital Medicine  Discharge Summary      Patient Name: Kenton Bush  MRN: [id number]  Admission Date: 3/28/2019  Hospital Length of Stay: 1 days  Discharge Date and Time: 3/29/2019  Attending Physician: No att. providers found   Discharging Provider: Paige Urbina MD  Primary Care Provider: To Obtain Unable      HPI:   This 43 y.o. male who has no known past medical history presents to the ED complaining of nausea and vomiting that began yesterday.  He reports greater than 30 episodes of vomiting last 24 hr.  He reports associated generalized abdominal pain. Patient admits to daily cannabinoid use and has had similar bouts of nausea and vomiting in the past.  He reports improvement of nausea when he takes a hot shower.  Denies diarrhea, constipation, dysuria, hematuria, urinary frequency, chest pain, shortness of breath, cough, fever or chills. No prior treatment.    On admit, Cr 1.8, WBC 19k, 9% bands. K 2.6, Lactate 4, AG 22, , /113 - pt admitted to ICU as a tele boarder.   Pt has a flight to catch today and would like to be discharged. Cardiology consulted.         * No surgery found *      Hospital Course:   Pt signed out  AMA.     Consults:   Consults (From admission, onward)        Status Ordering Provider     Inpatient consult to Cardiology  Once     Provider:  David Kohler MD    Completed PAIGE URBINA            Final Active Diagnoses:    Diagnosis Date Noted POA    PRINCIPAL PROBLEM:  NSTEMI (non-ST elevated myocardial infarction) [I21.4] 03/29/2019 Yes    Hypokalemia [E87.6] 03/29/2019 Yes    Nausea and vomiting [R11.2] 03/29/2019 Yes    TAMIA (acute kidney injury) [N17.9] 03/29/2019 Yes    Leukocytosis [D72.829] 03/29/2019 Yes    Hyperlipidemia [E78.5] 03/29/2019 Yes    Accelerated hypertension [I10] 03/29/2019 Yes    Marijuana use, continuous [F12.90] 03/29/2019 Yes      Problems Resolved During this Admission:       Discharged Condition:  good    Disposition: Left Against Medical Adv*    Follow Up:    Patient Instructions:   No discharge procedures on file.        Pending Diagnostic Studies:     None         Medications:  Reconciled Home Medications:      Medication List      You have not been prescribed any medications.         Indwelling Lines/Drains at time of discharge:   Lines/Drains/Airways          None          Time spent on the discharge of patient: 40 minutes  Patient was seen and examined on the date of discharge and determined to be suitable for discharge.         Rosana Osuna MD  Department of Hospital Medicine  [facility]

## 2020-09-02 NOTE — ED NOTES
Spoke with Melani telemetry monitor tech and states pt will be tele overflow in ICU and will be monitored there.    [FreeTextEntry1] : The patient is a 77-year-old gentleman who has a long history of nocturia.  He states that he is undergone 2 transurethral resections of his prostate without visit resolution of his symptoms.  He now complains of nocturia and lower abdominal discomfort at night.  Denies any hematuria or dysuria.  He does acknowledge snoring.  His lab studies show a normal urinalysis creatinine and PSA .\par Creatinine 0.76\par psa 0.5\par Urinalysis normal\par FLow low volume\par PVR 17 cc\par \par He notes he has chronic constipation.  He states he is scheduled for a colonoscopy.  I instructed him to take Colace 100 mg twice daily. \par \par  \par He notes nocturia has been persistent for many years in spite of his previous surgical procedures.  He does note that the lower abdominal discomfort associated with this.  He states that this is new in the last several months.  \par He is on Flomax finasteride and Myrbetric.  He will continue these pending evaluation \par in light of his symptoms , we discussed proceeding with cystoscopy to evaluate his lower urinary tract.  \par \par He is to proceed with colonoscopy.\par \par 9.2.2020\par patient returns having been seen in emergency room at [facility] for bladder pain]\par he subsequently was seen and evaluated by Dr PATRICK Thompson and told he has a sense of urgency without the need to urinate. He was started on Uroxatral and Oxybutynin\par peak flow 5.3\par voided volume 222\par \par patient instructed to get records Dr Thompson\par stop oxybutynin\par fu in one month with flow and PVR
